# Patient Record
Sex: FEMALE | Race: WHITE | URBAN - METROPOLITAN AREA
[De-identification: names, ages, dates, MRNs, and addresses within clinical notes are randomized per-mention and may not be internally consistent; named-entity substitution may affect disease eponyms.]

---

## 2020-06-15 ENCOUNTER — OFFICE VISIT (OUTPATIENT)
Dept: PRIMARY CARE CLINIC | Age: 35
End: 2020-06-15
Payer: COMMERCIAL

## 2020-06-15 VITALS — OXYGEN SATURATION: 98 % | TEMPERATURE: 97.6 F | HEART RATE: 76 BPM

## 2020-06-15 PROCEDURE — 99213 OFFICE O/P EST LOW 20 MIN: CPT | Performed by: NURSE PRACTITIONER

## 2020-06-15 NOTE — PROGRESS NOTES
6/15/2020    FLU/COVID-19 CLINIC EVALUATION    HPI  SYMPTOMS:    Symptom duration, days:  [] 1   [] 2   [] 3   [] 4   [] 5   [] 6   [] 7   [] 8   [] 9   [] 10   [] 11   [] 12   [] 13 [] 14 +    [x] Alert   [x]Oriented to person/place/time    [x]No apparent distress     []Toxic appearing    []Breathing appears normal     []Appears tachypneic         [x]Speaking in full sentences     Symptom course:   [] Worsening     [] Stable     [] Improving    [] Fevers  [] Symptom (not measured)  [] Measured (Result: )  [] Chills  [] Cough  [] Coughing up blood  []Productive  []Dry  [] Chest Congestion  []Chest Tightness  [] Nasal Congestion  []Runny Nose  [] Feeling short of breath  []Fatigue  [] Chest pain  [] Headaches  []Tolerable  [] Severe  [] Sore throat  [] Muscle aches  [] Nausea  [] Decreased appetite  [] Vomiting  []Unable to keep fluids down  [] Diarrhea  []Severe    [] OTHER SYMPTOMS:      RISK FACTORS:    [] Pregnant or possibly pregnant  [] Age over 61  [] Diabetes  [] Heart disease  [] Asthma  [] COPD/Other chronic lung diseases  [] Active Cancer  [] On Chemotherapy  [] Taking oral steroids  [] History Lymphoma/Leukemia  [] Close contact with a lab confirmed COVID-19 patient within 14 days of symptom onset  [] History of travel from affected geographical areas within 14 days of symptom onset  [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

## 2020-06-17 LAB
SARS-COV-2: NOT DETECTED
SOURCE: NORMAL

## 2020-06-23 ENCOUNTER — OFFICE VISIT (OUTPATIENT)
Dept: PRIMARY CARE CLINIC | Age: 35
End: 2020-06-23

## 2020-06-23 NOTE — PROGRESS NOTES
Favio Billings MD, personally performed the services described in the documentation as scribed by Karen Moon RN  . CMA, in my presence and it is both accurate and complete.   Electronically signed by [email or web address] at 2:31 PM.

## 2020-06-25 LAB
SARS-COV-2: NOT DETECTED
SOURCE: NORMAL